# Patient Record
Sex: MALE | Race: WHITE | NOT HISPANIC OR LATINO | URBAN - NONMETROPOLITAN AREA
[De-identification: names, ages, dates, MRNs, and addresses within clinical notes are randomized per-mention and may not be internally consistent; named-entity substitution may affect disease eponyms.]

---

## 2022-07-15 NOTE — PROGRESS NOTES
"Chief Complaint  \"I want a vasectomy \"    Subjective          History of Present Illness  Carlos Sheffield presents to [facility] for:    The patient has been pondering the option of a vasectomy for1 year.    He voices no additional questions about birth control options.   No prior genital procedures.       Current Outpatient Medications:   •  ALPRAZolam (XANAX) 2 MG tablet, Take 1 tablet by mouth 1 (One) Time for 1 dose. Bring to office for procedure, Disp: 1 tablet, Rfl: 0  History reviewed. No pertinent past medical history.  History reviewed. No pertinent surgical history.      Review  of systems  Constitutional: Negative for chills and fever.   Gastrointestinal: Negative for abdominal pain, anal bleeding and blood in stool.   Genitourinary: Negative for flank pain and hematuria.      Objective   PHYSICAL EXAM  Vital Signs:   Temp 98 °F (36.7 °C)   Ht 182.9 cm (72\")   Wt 119 kg (262 lb 12.8 oz)   BMI 35.64 kg/m²     Constitutional: Well nourished, Well developed; No apparent distress  Psychiatric: Appropriate affect; Alert and oriented  Musculoskeletal: Normal gait and station  GI: Abdomen is soft, non-tender  Respiratory: No distress; Unlabored movement; No accessory musculature needed with symmetric movements  ; Penis and testicles are normal.  The vas deferens is palpable bilaterally and appears accessible for vasectomy.  Vitals reviewed.        DATA  Result Review :         No results found for this or any previous visit.             ASSESSMENT AND PLAN      Problem List Items Addressed This Visit    None     Visit Diagnoses     Visit for vasectomy evaluation    -  Primary    Relevant Medications    ALPRAZolam (XANAX) 2 MG tablet    Other Relevant Orders    Vasectomy        The patient desires vasectomy.  Risks of this procedure are discussed.  We discussed that it does take up to 6 months for a patient to clear the proximal sperm through the process of ejaculation.  It is " explained that postoperative specimens are essential before consideration of birth control cessation.  Risks of bleeding, infection, sperm granuloma, testicular pain that can become prolonged such as post vasectomy neuralgia, recanalization with resumption of fertility status, testicular atrophy are included in the discussion.  The patient is made aware of other birth control options including permanent sterilization procedures for females.  It is also explained the vasectomy does not reduce the risk of sexually transmitted disease.  Discussion of the use of preprocedural benzodiazepine and postoperative opiate narcotic relief is also undertaken.  Risks and  addictive properties discussed.  The patient has consented to the procedure.        FOLLOW UP     No follow-ups on file.        (Please note that portions of this note were completed with a voice recognition program.)  Arturo Galloway MD  07/18/22  12:46 CDT

## 2022-07-18 ENCOUNTER — OFFICE VISIT (OUTPATIENT)
Dept: UROLOGY | Facility: CLINIC | Age: 27
End: 2022-07-18

## 2022-07-18 VITALS — BODY MASS INDEX: 35.59 KG/M2 | WEIGHT: 262.8 LBS | TEMPERATURE: 98 F | HEIGHT: 72 IN

## 2022-07-18 DIAGNOSIS — Z30.09 VISIT FOR VASECTOMY EVALUATION: Primary | ICD-10-CM

## 2022-07-18 PROCEDURE — 99203 OFFICE O/P NEW LOW 30 MIN: CPT | Performed by: UROLOGY

## 2022-07-18 RX ORDER — ALPRAZOLAM 2 MG/1
2 TABLET ORAL ONCE
Qty: 1 TABLET | Refills: 0 | Status: SHIPPED | OUTPATIENT
Start: 2022-07-18 | End: 2022-07-18

## 2022-08-03 ENCOUNTER — TELEPHONE (OUTPATIENT)
Dept: UROLOGY | Facility: CLINIC | Age: 27
End: 2022-08-03

## 2022-08-03 NOTE — TELEPHONE ENCOUNTER
Called patient to see if he could come in Friday at 0830 for his vasectomy but had to leave a messge

## 2022-08-05 ENCOUNTER — PROCEDURE VISIT (OUTPATIENT)
Dept: UROLOGY | Facility: CLINIC | Age: 27
End: 2022-08-05

## 2022-08-05 DIAGNOSIS — Z30.09 VISIT FOR VASECTOMY EVALUATION: ICD-10-CM

## 2022-08-05 PROCEDURE — 99024 POSTOP FOLLOW-UP VISIT: CPT | Performed by: UROLOGY

## 2022-08-05 NOTE — PROGRESS NOTES
CC: I am here to have a vasectomy    Vasectomy procedure note  Patient has been premedicated with alprazolam and Norco.  He has done the appropriate shave the day before the procedure.  He is placed in the supine position.  The usual prep and drape with Betadine is carried out.  The vas is palpated on the right side and  from the remainder of the cord bluntly and pulled just underneath the skin.  1% lidocaine is infiltrated in the subcutaneous tissue.  An incision is made directly onto the vas.  As I tried to isolate the vas from the cord by grabbing it with a vas clamp the patient simply could not tolerate this.  He began having pain and feeling nausea.  He got diaphoretic.  Ultimately he did not throw up.  He is decided he wants me to do this under anesthetic.  At that point we inspected for bleeding.  Placed chromic sutures and the skin to approximate the epithelial edges we will reschedule him in the operating room under anesthetic.  Obviously we were unable to complete the vasectomy.  Arturo Galloway MD  8/5/2022  11:19 CDT

## 2022-08-09 ENCOUNTER — PREP FOR SURGERY (OUTPATIENT)
Dept: OTHER | Facility: HOSPITAL | Age: 27
End: 2022-08-09

## 2022-08-09 DIAGNOSIS — C61 PROSTATE CANCER: Primary | ICD-10-CM
